# Patient Record
Sex: MALE | Race: BLACK OR AFRICAN AMERICAN | NOT HISPANIC OR LATINO | Employment: FULL TIME | URBAN - METROPOLITAN AREA
[De-identification: names, ages, dates, MRNs, and addresses within clinical notes are randomized per-mention and may not be internally consistent; named-entity substitution may affect disease eponyms.]

---

## 2017-02-09 ENCOUNTER — HOSPITAL ENCOUNTER (OUTPATIENT)
Facility: MEDICAL CENTER | Age: 22
End: 2017-02-09
Attending: FAMILY MEDICINE
Payer: COMMERCIAL

## 2017-02-09 ENCOUNTER — OFFICE VISIT (OUTPATIENT)
Dept: URGENT CARE | Facility: PHYSICIAN GROUP | Age: 22
End: 2017-02-09
Payer: COMMERCIAL

## 2017-02-09 VITALS
HEIGHT: 72 IN | SYSTOLIC BLOOD PRESSURE: 118 MMHG | WEIGHT: 160 LBS | HEART RATE: 74 BPM | OXYGEN SATURATION: 99 % | BODY MASS INDEX: 21.67 KG/M2 | TEMPERATURE: 98.3 F | DIASTOLIC BLOOD PRESSURE: 62 MMHG | RESPIRATION RATE: 12 BRPM

## 2017-02-09 DIAGNOSIS — D70.9 NEUTROPENIA, UNSPECIFIED TYPE (HCC): ICD-10-CM

## 2017-02-09 DIAGNOSIS — D69.6 THROMBOCYTOPENIA (HCC): ICD-10-CM

## 2017-02-09 DIAGNOSIS — R53.83 FATIGUE, UNSPECIFIED TYPE: ICD-10-CM

## 2017-02-09 DIAGNOSIS — M79.10 MYALGIA: ICD-10-CM

## 2017-02-09 LAB
ANION GAP SERPL CALC-SCNC: 7 MMOL/L (ref 0–11.9)
APPEARANCE UR: CLEAR
BASOPHILS # BLD AUTO: 0.01 K/UL (ref 0–0.12)
BASOPHILS NFR BLD AUTO: 0.4 % (ref 0–1.8)
BILIRUB UR STRIP-MCNC: ABNORMAL MG/DL
BUN SERPL-MCNC: 11 MG/DL (ref 8–22)
CALCIUM SERPL-MCNC: 9.7 MG/DL (ref 8.5–10.5)
CHLORIDE SERPL-SCNC: 104 MMOL/L (ref 96–112)
CK SERPL-CCNC: 148 U/L (ref 0–154)
CO2 SERPL-SCNC: 26 MMOL/L (ref 20–33)
COLOR UR AUTO: YELLOW
CREAT SERPL-MCNC: 0.98 MG/DL (ref 0.5–1.4)
EOSINOPHIL # BLD: 0.01 K/UL (ref 0–0.51)
EOSINOPHIL NFR BLD AUTO: 0.4 % (ref 0–6.9)
ERYTHROCYTE [DISTWIDTH] IN BLOOD BY AUTOMATED COUNT: 40.7 FL (ref 35.9–50)
FLUAV+FLUBV AG SPEC QL IA: NEGATIVE
GLUCOSE SERPL-MCNC: 81 MG/DL (ref 65–99)
GLUCOSE UR STRIP.AUTO-MCNC: ABNORMAL MG/DL
HCT VFR BLD AUTO: 45.4 % (ref 42–52)
HETEROPH AB SER QL LA: NEGATIVE
HGB BLD-MCNC: 14.8 G/DL (ref 14–18)
IMM GRANULOCYTES # BLD AUTO: 0 K/UL (ref 0–0.11)
IMM GRANULOCYTES NFR BLD AUTO: 0 % (ref 0–0.9)
INT CON NEG: NORMAL
INT CON NEG: NORMAL
INT CON POS: NORMAL
INT CON POS: NORMAL
KETONES UR STRIP.AUTO-MCNC: ABNORMAL MG/DL
LEUKOCYTE ESTERASE UR QL STRIP.AUTO: ABNORMAL
LYMPHOCYTES # BLD: 1.16 K/UL (ref 1–4.8)
LYMPHOCYTES NFR BLD AUTO: 45.8 % (ref 22–41)
MCH RBC QN AUTO: 29.2 PG (ref 27–33)
MCHC RBC AUTO-ENTMCNC: 32.6 G/DL (ref 33.7–35.3)
MCV RBC AUTO: 89.5 FL (ref 81.4–97.8)
MONOCYTES # BLD: 0.33 K/UL (ref 0–0.85)
MONOCYTES NFR BLD AUTO: 13 % (ref 0–13.4)
NEUTROPHILS # BLD: 1.02 K/UL (ref 1.82–7.42)
NEUTROPHILS NFR BLD AUTO: 40.4 % (ref 44–72)
NITRITE UR QL STRIP.AUTO: ABNORMAL
NRBC # BLD AUTO: 0 K/UL
NRBC BLD-RTO: 0 /100 WBC
PH UR STRIP.AUTO: 8 [PH] (ref 5–8)
PLATELET # BLD AUTO: 128 K/UL (ref 164–446)
PMV BLD AUTO: 10.3 FL (ref 9–12.9)
POTASSIUM SERPL-SCNC: 3.9 MMOL/L (ref 3.6–5.5)
PROT UR QL STRIP: ABNORMAL MG/DL
RBC # BLD AUTO: 5.07 M/UL (ref 4.7–6.1)
RBC UR QL AUTO: ABNORMAL
SODIUM SERPL-SCNC: 137 MMOL/L (ref 135–145)
SP GR UR STRIP.AUTO: 1.01
TSH SERPL DL<=0.005 MIU/L-ACNC: 0.6 UIU/ML (ref 0.3–3.7)
UROBILINOGEN UR STRIP-MCNC: 0.2 MG/DL
WBC # BLD AUTO: 2.5 K/UL (ref 4.8–10.8)

## 2017-02-09 PROCEDURE — 99203 OFFICE O/P NEW LOW 30 MIN: CPT | Performed by: FAMILY MEDICINE

## 2017-02-09 PROCEDURE — 84443 ASSAY THYROID STIM HORMONE: CPT

## 2017-02-09 PROCEDURE — 86308 HETEROPHILE ANTIBODY SCREEN: CPT | Performed by: FAMILY MEDICINE

## 2017-02-09 PROCEDURE — 87804 INFLUENZA ASSAY W/OPTIC: CPT | Performed by: FAMILY MEDICINE

## 2017-02-09 PROCEDURE — 82550 ASSAY OF CK (CPK): CPT

## 2017-02-09 PROCEDURE — 81002 URINALYSIS NONAUTO W/O SCOPE: CPT | Performed by: FAMILY MEDICINE

## 2017-02-09 PROCEDURE — 85025 COMPLETE CBC W/AUTO DIFF WBC: CPT

## 2017-02-09 PROCEDURE — 80048 BASIC METABOLIC PNL TOTAL CA: CPT

## 2017-02-09 ASSESSMENT — ENCOUNTER SYMPTOMS
BODY ACHES: 1
EYE REDNESS: 0
WEIGHT LOSS: 0
FOCAL WEAKNESS: 0
EYE DISCHARGE: 0
SENSORY CHANGE: 0

## 2017-02-09 NOTE — PROGRESS NOTES
Subjective:      Irineo Loyola is a 21 y.o. male who presents with Generalized Body Aches            Generalized Body Aches  This is a new problem. Episode onset: 3-4 days myalgia, fatigue, mild nasal congestion. No HA. No ST. No cough. No new medications. Using OTC advil.  Pertinent negatives include no rash.   No other aggravating or alleviating factors.      Review of Systems   Constitutional: Negative for weight loss and malaise/fatigue.   Eyes: Negative for discharge and redness.   Skin: Negative for itching and rash.   Neurological: Negative for sensory change and focal weakness.   .  Medications, Allergies, and current problem list reviewed today in Epic  No know PMH/FH sickle cell anemia       Objective:     /62 mmHg  Pulse 74  Temp(Src) 36.8 °C (98.3 °F)  Resp 12  Ht 1.829 m (6')  Wt 72.576 kg (160 lb)  BMI 21.70 kg/m2  SpO2 99%     Physical Exam   Constitutional: He is oriented to person, place, and time. He appears well-developed and well-nourished. No distress.   HENT:   Head: Normocephalic and atraumatic.   Nasal congestion     Eyes: Conjunctivae and EOM are normal. Pupils are equal, round, and reactive to light.   Neck: Neck supple.   Cardiovascular: Normal rate, regular rhythm and normal heart sounds.    Pulmonary/Chest: Effort normal and breath sounds normal. He has no wheezes.   Abdominal: Soft. Bowel sounds are normal. There is no tenderness.   Musculoskeletal: Normal range of motion. He exhibits no edema.   Lymphadenopathy:     He has no cervical adenopathy.   Neurological: He is alert and oriented to person, place, and time.               Assessment/Plan:     1. Myalgia  POCT Influenza A/B    POCT Urinalysis    CBC WITH DIFFERENTIAL    BASIC METABOLIC PANEL    TSH    CREATINE KINASE   2. Fatigue, unspecified type  POCT Mononucleosis (mono)    POCT Influenza A/B    POCT Urinalysis    CBC WITH DIFFERENTIAL    BASIC METABOLIC PANEL    TSH    CREATINE KINASE       UA reviewed  [place]  negative  Influenza negative    Will f/u labs

## 2017-02-10 NOTE — PROGRESS NOTES
Labs reviewed. Will refer to hematology for leukopenia/thrombocytopenia. Attempted to call patient without success.

## 2017-02-15 ENCOUNTER — OFFICE VISIT (OUTPATIENT)
Dept: HEMATOLOGY ONCOLOGY | Facility: MEDICAL CENTER | Age: 22
End: 2017-02-15
Payer: COMMERCIAL

## 2017-02-15 ENCOUNTER — HOSPITAL ENCOUNTER (OUTPATIENT)
Facility: MEDICAL CENTER | Age: 22
End: 2017-02-15
Attending: INTERNAL MEDICINE
Payer: COMMERCIAL

## 2017-02-15 ENCOUNTER — NON-PROVIDER VISIT (OUTPATIENT)
Dept: HEMATOLOGY ONCOLOGY | Facility: MEDICAL CENTER | Age: 22
End: 2017-02-15
Payer: COMMERCIAL

## 2017-02-15 VITALS
RESPIRATION RATE: 16 BRPM | OXYGEN SATURATION: 100 % | DIASTOLIC BLOOD PRESSURE: 70 MMHG | BODY MASS INDEX: 22.7 KG/M2 | TEMPERATURE: 99.1 F | HEIGHT: 72 IN | WEIGHT: 167.6 LBS | SYSTOLIC BLOOD PRESSURE: 102 MMHG | HEART RATE: 70 BPM

## 2017-02-15 VITALS
RESPIRATION RATE: 16 BRPM | SYSTOLIC BLOOD PRESSURE: 102 MMHG | TEMPERATURE: 99.1 F | HEART RATE: 70 BPM | BODY MASS INDEX: 22.7 KG/M2 | WEIGHT: 167.6 LBS | DIASTOLIC BLOOD PRESSURE: 70 MMHG | OXYGEN SATURATION: 100 % | HEIGHT: 72 IN

## 2017-02-15 DIAGNOSIS — D70.3 NEUTROPENIA ASSOCIATED WITH INFECTION (HCC): ICD-10-CM

## 2017-02-15 DIAGNOSIS — D69.6 THROMBOCYTOPENIA (HCC): ICD-10-CM

## 2017-02-15 PROBLEM — D70.9 NEUTROPENIA (HCC): Status: ACTIVE | Noted: 2017-02-15

## 2017-02-15 LAB
BASOPHILS # BLD AUTO: 0.03 K/UL (ref 0–0.12)
BASOPHILS NFR BLD AUTO: 0.9 % (ref 0–1.8)
EOSINOPHIL # BLD: 0.03 K/UL (ref 0–0.51)
EOSINOPHIL NFR BLD AUTO: 0.9 % (ref 0–6.9)
ERYTHROCYTE [DISTWIDTH] IN BLOOD BY AUTOMATED COUNT: 38.8 FL (ref 35.9–50)
HCT VFR BLD AUTO: 42.1 % (ref 42–52)
HGB BLD-MCNC: 14.3 G/DL (ref 14–18)
LYMPHOCYTES # BLD: 1.95 K/UL (ref 1–4.8)
LYMPHOCYTES NFR BLD AUTO: 69.5 % (ref 22–41)
MANUAL DIFF BLD: ABNORMAL
MCH RBC QN AUTO: 29.7 PG (ref 27–33)
MCHC RBC AUTO-ENTMCNC: 34 G/DL (ref 33.7–35.3)
MCV RBC AUTO: 87.3 FL (ref 81.4–97.8)
MONOCYTES # BLD: 0.15 K/UL (ref 0–0.85)
MONOCYTES NFR BLD AUTO: 5.2 % (ref 0–13.4)
NEUTROPHILS # BLD: 0.66 K/UL (ref 1.82–7.42)
NEUTROPHILS NFR BLD AUTO: 23.5 % (ref 44–72)
PLATELET # BLD AUTO: 147 K/UL (ref 164–446)
PMV BLD AUTO: 9.8 FL (ref 9–12.9)
RBC # BLD AUTO: 4.82 M/UL (ref 4.7–6.1)
WBC # BLD AUTO: 2.8 K/UL (ref 4.8–10.8)

## 2017-02-15 PROCEDURE — 36415 COLL VENOUS BLD VENIPUNCTURE: CPT | Performed by: INTERNAL MEDICINE

## 2017-02-15 PROCEDURE — 85007 BL SMEAR W/DIFF WBC COUNT: CPT

## 2017-02-15 PROCEDURE — 99204 OFFICE O/P NEW MOD 45 MIN: CPT | Performed by: INTERNAL MEDICINE

## 2017-02-15 PROCEDURE — 85027 COMPLETE CBC AUTOMATED: CPT

## 2017-02-15 ASSESSMENT — PAIN SCALES - GENERAL
PAINLEVEL: NO PAIN
PAINLEVEL: NO PAIN

## 2017-02-15 NOTE — MR AVS SNAPSHOT
Irineo Loyola   2/15/2017 9:40 AM   Office Visit   MRN: [id number]    Department:  [facility]   Dept Phone:  [phone]    Description:  Male : 1995   Provider:  Victorino Maldonado M.D.           Reason for Visit     New Patient Leukopenia. Ref: Dr. Sanchez      Allergies as of 2/15/2017     No Known Allergies      Vital Signs     Blood Pressure Pulse Temperature Respirations Height Weight    102/70 mmHg 70 37.3 °C (99.1 °F) 16 1.829 m (6') 76.023 kg (167 lb 9.6 oz)    Body Mass Index Oxygen Saturation Smoking Status             22.73 kg/m2 100% Never Smoker          Basic Information     Date Of Birth Sex Race Ethnicity Preferred Language    1995 Male Black or  Non- English      Problem List              ICD-10-CM Priority Class Noted - Resolved    Thrombocytopenia (CMS-HCC) D69.6   2/15/2017 - Present      Health Maintenance        Date Due Completion Dates    IMM HEP B VACCINE (1 of 3 - Primary Series) 1993 ---    IMM HEP A VACCINE (1 of 2 - Standard Series) 1994 ---    IMM HPV VACCINE (1 of 3 - Male 3 Dose Series) 2004 ---    IMM VARICELLA (CHICKENPOX) VACCINE (1 of 2 - 2 Dose Adolescent Series) 2006 ---    IMM DTaP/Tdap/Td Vaccine (1 - Tdap) 2012 ---    IMM INFLUENZA (1) 2016 ---            Current Immunizations     No immunizations on file.      Below and/or attached are the medications your provider expects you to take. Review all of your home medications and newly ordered medications with your provider and/or pharmacist. Follow medication instructions as directed by your provider and/or pharmacist. Please keep your medication list with you and share with your provider. Update the information when medications are discontinued, doses are changed, or new medications (including over-the-counter products) are added; and carry medication information at all times in the event of emergency situations     Allergies:  No Known Allergies             Medications  Valid as of: February 15, 2017 - 11:26 AM    Generic Name Brand Name Tablet Size Instructions for use    .                 Medicines prescribed today were sent to:     [facility]/PHARMACY #[id number] - [place], [place] - [address]    [address] [place] [address]    Phone: [phone] Fax: [phone]    Open 24 Hours?: No      Medication refill instructions:       If your prescription bottle indicates you have medication refills left, it is not necessary to call your provider’s office. Please contact your pharmacy and they will refill your medication.    If your prescription bottle indicates you do not have any refills left, you may request refills at any time through one of the following ways: The online AgentPair system (except Urgent Care), by calling your provider’s office, or by asking your pharmacy to contact your provider’s office with a refill request. Medication refills are processed only during regular business hours and may not be available until the next business day. Your provider may request additional information or to have a follow-up visit with you prior to refilling your medication.   *Please Note: Medication refills are assigned a new Rx number when refilled electronically. Your pharmacy may indicate that no refills were authorized even though a new prescription for the same medication is available at the pharmacy. Please request the medicine by name with the pharmacy before contacting your provider for a refill.           AgentPair Access Code: [email or web address]  Expires: 3/17/2017 11:26 AM    AgentPair  A secure, online tool to manage your health information     [employer]’s AgentPair® is a secure, online tool that connects you to your personalized health information from the privacy of your home -- day or night - making it very easy for you to manage your healthcare. Once the activation process is completed, you can even access your medical information using the AgentPair dana, which is  available for free in the Apple Scout store or Google Play store.     Converged Access provides the following levels of access (as shown below):   My Chart Features   [facility] Doctor [facility]  Specialists [facility]  Urgent  Care Non-[facility]  Primary Care  Doctor   Email your healthcare team securely and privately 24/7 X X X    Manage appointments: schedule your next appointment; view details of past/upcoming appointments X      Request prescription refills. X      View recent personal medical records, including lab and immunizations X X X X   View health record, including health history, allergies, medications X X X X   Read reports about your outpatient visits, procedures, consult and ER notes X X X X   See your discharge summary, which is a recap of your hospital and/or ER visit that includes your diagnosis, lab results, and care plan. X X       How to register for Converged Access:  1. Go to  [email or web address].  2. Click on the Sign Up Now box, which takes you to the New Member Sign Up page. You will need to provide the following information:  a. Enter your Converged Access Access Code exactly as it appears at the top of this page. (You will not need to use this code after you’ve completed the sign-up process. If you do not sign up before the expiration date, you must request a new code.)   b. Enter your date of birth.   c. Enter your home email address.   d. Click Submit, and follow the next screen’s instructions.  3. Create a Converged Access ID. This will be your Converged Access login ID and cannot be changed, so think of one that is secure and easy to remember.  4. Create a Converged Access password. You can change your password at any time.  5. Enter your Password Reset Question and Answer. This can be used at a later time if you forget your password.   6. Enter your e-mail address. This allows you to receive e-mail notifications when new information is available in Converged Access.  7. Click Sign Up. You can now view your health information.    For  assistance activating your [employer] account, call [phone]

## 2017-02-15 NOTE — MR AVS SNAPSHOT
Irineo Loyola   2/15/2017 10:30 AM   Non-Provider Visit   MRN: [id number]    Department:  [facility]   Dept Phone:  [phone]    Description:  Male : 1995   Provider:  [facility] MA 1           Reason for Visit     Other Labs, peripheral CBC with manual diff      Allergies as of 2/15/2017     No Known Allergies      You were diagnosed with     Thrombocytopenia (CMS-HCC)   [454902]         Vital Signs     Blood Pressure Pulse Temperature Respirations Height Weight    102/70 mmHg 70 37.3 °C (99.1 °F) 16 1.829 m (6') 76.023 kg (167 lb 9.6 oz)    Body Mass Index Oxygen Saturation Smoking Status             22.73 kg/m2 100% Never Smoker          Basic Information     Date Of Birth Sex Race Ethnicity Preferred Language    1995 Male Black or  Non- English      Problem List              ICD-10-CM Priority Class Noted - Resolved    Thrombocytopenia (CMS-HCC) D69.6   2/15/2017 - Present      Health Maintenance        Date Due Completion Dates    IMM HEP B VACCINE (1 of 3 - Primary Series) 1993 ---    IMM HEP A VACCINE (1 of 2 - Standard Series) 1994 ---    IMM HPV VACCINE (1 of 3 - Male 3 Dose Series) 2004 ---    IMM VARICELLA (CHICKENPOX) VACCINE (1 of 2 - 2 Dose Adolescent Series) 2006 ---    IMM DTaP/Tdap/Td Vaccine (1 - Tdap) 2012 ---    IMM INFLUENZA (1) 2016 ---            Current Immunizations     No immunizations on file.      Below and/or attached are the medications your provider expects you to take. Review all of your home medications and newly ordered medications with your provider and/or pharmacist. Follow medication instructions as directed by your provider and/or pharmacist. Please keep your medication list with you and share with your provider. Update the information when medications are discontinued, doses are changed, or new medications (including over-the-counter products) are added; and carry medication information at all times in the  event of emergency situations     Allergies:  No Known Allergies          Medications  Valid as of: February 15, 2017 - 11:27 AM    Generic Name Brand Name Tablet Size Instructions for use    .                 Medicines prescribed today were sent to:     [facility]/PHARMACY #[id number] - [place], [place] - [address]    [address] [place] [address]    Phone: [phone] Fax: [phone]    Open 24 Hours?: No      Medication refill instructions:       If your prescription bottle indicates you have medication refills left, it is not necessary to call your provider’s office. Please contact your pharmacy and they will refill your medication.    If your prescription bottle indicates you do not have any refills left, you may request refills at any time through one of the following ways: The online Siftit system (except Urgent Care), by calling your provider’s office, or by asking your pharmacy to contact your provider’s office with a refill request. Medication refills are processed only during regular business hours and may not be available until the next business day. Your provider may request additional information or to have a follow-up visit with you prior to refilling your medication.   *Please Note: Medication refills are assigned a new Rx number when refilled electronically. Your pharmacy may indicate that no refills were authorized even though a new prescription for the same medication is available at the pharmacy. Please request the medicine by name with the pharmacy before contacting your provider for a refill.           Siftit Access Code: [email or web address]  Expires: 3/17/2017 11:26 AM    Siftit  A secure, online tool to manage your health information     [employer]’s Siftit® is a secure, online tool that connects you to your personalized health information from the privacy of your home -- day or night - making it very easy for you to manage your healthcare. Once the activation process is completed, you can even  access your medical information using the Advanced Oncotherapy scout, which is available for free in the Apple Scout store or Google Play store.     Advanced Oncotherapy provides the following levels of access (as shown below):   My Chart Features   [facility] Doctor [facility]  Specialists [facility]  Urgent  Care Non-[facility]  Primary Care  Doctor   Email your healthcare team securely and privately 24/7 X X X    Manage appointments: schedule your next appointment; view details of past/upcoming appointments X      Request prescription refills. X      View recent personal medical records, including lab and immunizations X X X X   View health record, including health history, allergies, medications X X X X   Read reports about your outpatient visits, procedures, consult and ER notes X X X X   See your discharge summary, which is a recap of your hospital and/or ER visit that includes your diagnosis, lab results, and care plan. X X       How to register for Advanced Oncotherapy:  1. Go to  [email or web address].  2. Click on the Sign Up Now box, which takes you to the New Member Sign Up page. You will need to provide the following information:  a. Enter your Advanced Oncotherapy Access Code exactly as it appears at the top of this page. (You will not need to use this code after you’ve completed the sign-up process. If you do not sign up before the expiration date, you must request a new code.)   b. Enter your date of birth.   c. Enter your home email address.   d. Click Submit, and follow the next screen’s instructions.  3. Create a Advanced Oncotherapy ID. This will be your Advanced Oncotherapy login ID and cannot be changed, so think of one that is secure and easy to remember.  4. Create a Advanced Oncotherapy password. You can change your password at any time.  5. Enter your Password Reset Question and Answer. This can be used at a later time if you forget your password.   6. Enter your e-mail address. This allows you to receive e-mail notifications when new information is available in Advanced Oncotherapy.  7. Click  Sign Up. You can now view your health information.    For assistance activating your [employer] account, call [phone]

## 2017-02-15 NOTE — PROGRESS NOTES
Consult Note: Hematology    Date of consultation: 2/15/2017 10:30 AM    Reason for consultation: Leukopenia and pancytopenia      History of presenting illness:      Irineo Loyola  is a pleasant 21 y.o. year old -American male who presented with myalgias and nasal congestion last week and was found to have leukopenia with relative lymphocytosis and mild cytopenia. Monospot testing and flu screen was negative. He is here for further evaluation of this. He is feeling much better. His CBC continues to show the above picture. He does not have any pre-existing illness. He was never informed of any cytopenias prior to this.       Past Medical History:  No past medical history on file.    Past surgical history:  No past surgical history on file.    Allergies:  Review of patient's allergies indicates no known allergies.    Medications:    No current outpatient prescriptions on file.     No current facility-administered medications for this visit.       Social History:     Social History     Social History   • Marital Status:      Spouse Name: N/A   • Number of Children: N/A   • Years of Education: N/A     Occupational History   • Not on file.     Social History Main Topics   • Smoking status: Never Smoker    • Smokeless tobacco: Not on file   • Alcohol Use: Not on file   • Drug Use: Not on file   • Sexual Activity: Not on file     Other Topics Concern   • Not on file     Social History Narrative   • No narrative on file       Family History:   No family history on file.    Review of Systems:  All other review of systems are negative except what was mentioned above in the HPI.    Constitutional: No fever, chills, weight loss ,malaise/fatigue.    HEENT: No new auditory or visual complaints. No sore throat and neck pain.     Respiratory:No new cough, sputum production, shortness of breath and wheezing.    Cardiovascular: No new chest pain, palpitations, orthopnea and leg swelling.    Gastrointestinal: No  heartburn, nausea, vomiting ,abdominal pain, hematochezia or melena     Genitourinary: Negative for dysuria, hematuria    Musculoskeletal: No new arthralgias or myalgias   Skin: Negative for rash and itching.    Neurological: Negative for focal weakness or headaches.    Endo/Heme/Allergies: No abnormal bleed/bruise.    Psychiatric/Behavioral: No new depression/anxiety.    Physical Exam:  Vitals:   /70 mmHg  Pulse 70  Temp(Src) 37.3 °C (99.1 °F)  Resp 16  Ht 1.829 m (6')  Wt 76.023 kg (167 lb 9.6 oz)  BMI 22.73 kg/m2  SpO2 100%    General: Not in acute distress, alert and oriented x 3  HEENT: No pallor, icterus. Oropharynx clear.   Neck: Supple, no palpable masses.  Lymph nodes: No palpable cervical, supraclavicular, axillary or inguinal lymphadenopathy.    CVS: regular rate and rhythm, no rubs or gallops  RESP: Clear to auscultate bilaterally, no wheezing or crackles.   ABD: Soft, non tender, non distended, positive bowel sounds, no palpable organomegaly  EXT: No edema or cyanosis  CNS: Alert and oriented x3, No focal deficits.  Skin- No rash      Labs:   Recent Labs      02/15/17   1023   RBC  4.82   HEMOGLOBIN  14.3   HEMATOCRIT  42.1   PLATELETCT  147*     Lab Results   Component Value Date/Time    SODIUM 137 02/09/2017 04:13 PM    POTASSIUM 3.9 02/09/2017 04:13 PM    CHLORIDE 104 02/09/2017 04:13 PM    CO2 26 02/09/2017 04:13 PM    GLUCOSE 81 02/09/2017 04:13 PM    BUN 11 02/09/2017 04:13 PM    CREATININE 0.98 02/09/2017 04:13 PM      Peripheral smear- rbc's-normocytic normochromic. WBC shows relative lymphocytosis with reactive appearing mature lymphocytes. He has mild monocytosis. No immature forms or blasts. Platelets minimally reduced with no abnormalities.      Assessment and Plan:  Leukopenia with relative lymphocytosis and mild thrombocytopenia- this appears to be post viral. Clinically, he is feeling better and I expect his counts to improve gradually. I will do basic workup for his  cytopenia including hepatitis, HIV screen, ESR and SUSAN titer along with a CMP. I will recheck his CBC in  3 weeks from now and hopefully his counts will be better by that time. If not, we will consider doing further workup at that point.    He agreed and verbalized his agreement and understanding with the current plan.  I answered all questions and concerns he has at this time.              Thank you for allowing me to participate in his care.    Please note that this dictation was created using voice recognition software. I have made every reasonable attempt to correct obvious errors, but I expect that there are errors of grammar and possibly content that I did not discover before finalizing the note.      SIGNATURES:  Victorino Maldonado    CC:  Pcp Pt States None  No ref. provider found

## 2017-02-15 NOTE — NON-PROVIDER
Prepped patient's right arm for peripheral venipuncture. One lavender tube was drawn with a 21 gauge needle without complication.

## 2017-06-05 ENCOUNTER — TELEPHONE (OUTPATIENT)
Dept: HEMATOLOGY ONCOLOGY | Facility: MEDICAL CENTER | Age: 22
End: 2017-06-05

## 2017-06-05 NOTE — TELEPHONE ENCOUNTER
Attempted to leave a message for the patient to call me back at medical oncology. His mailbox was full and I was unable to leave a voicemail. He was supposed to be scheduled for a 3 week follow up with Dr. Maldonado in medical oncology and a repeat CBC and he was never scheduled. He will need to be scheduled for an established hematology appt and a repeat CBC next available.

## 2017-06-08 ENCOUNTER — OFFICE VISIT (OUTPATIENT)
Dept: URGENT CARE | Facility: PHYSICIAN GROUP | Age: 22
End: 2017-06-08
Payer: COMMERCIAL

## 2017-06-08 VITALS
OXYGEN SATURATION: 100 % | TEMPERATURE: 99.9 F | SYSTOLIC BLOOD PRESSURE: 124 MMHG | RESPIRATION RATE: 18 BRPM | HEART RATE: 78 BPM | DIASTOLIC BLOOD PRESSURE: 62 MMHG | BODY MASS INDEX: 22.92 KG/M2 | WEIGHT: 169 LBS
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
## 2017-06-08 PROCEDURE — 99202 OFFICE O/P NEW SF 15 MIN: CPT | Performed by: NURSE PRACTITIONER

## 2017-06-08 RX ORDER — CEPHALEXIN 250 MG/1
250 CAPSULE ORAL 4 TIMES DAILY
Qty: 28 CAP | Refills: 0 | Status: SHIPPED | OUTPATIENT
Start: 2017-06-08 | End: 2017-06-15

## 2017-06-08 ASSESSMENT — ENCOUNTER SYMPTOMS
DIAPHORESIS: 0
FEVER: 0
CHILLS: 0
WEAKNESS: 0

## 2017-06-08 NOTE — PROGRESS NOTES
Subjective:      Irineo Loyola is a 21 y.o. male who presents with Insect Bite            HPI  Patient comes in today with insect bites to the left thigh.  He noted some mild itching yesterday and reports worsening redness today.  He denies any other insect bites or stings.  He has not taken any medications to treat the symptoms.  No suspected exposure to bed bugs or scabies.  No household contacts with similar symptoms.  He does have known thrombocytopenia and neutropenia, and was recommended to follow up with Dr. Maldonado (hematology) for repeat CBC but has not followed up as recommended.  Denies any persistent fever or easy bruising.  He is leaving for a 10 day trip to [place] today.    Review of Systems   Constitutional: Negative for fever, chills, malaise/fatigue and diaphoresis.   Skin: Positive for itching and rash.   Neurological: Negative for weakness.     Medications, Allergies, and current problem list reviewed today in Epic     Objective:     /62 mmHg  Pulse 78  Temp(Src) 37.7 °C (99.9 °F)  Resp 18  Wt 76.658 kg (169 lb)  SpO2 100%     Physical Exam   Constitutional: He is oriented to person, place, and time. He appears well-developed and well-nourished. No distress.   Cardiovascular: Normal rate and normal heart sounds.    Pulmonary/Chest: Effort normal and breath sounds normal.   Neurological: He is alert and oriented to person, place, and time.   Skin: He is not diaphoretic.        Multiple erythematous irregularly round lesions on the left upper thigh.  Lesions are non-blanching and slightly warm to the touch.  Slight induration noted.  NO fluctuance.  Minimally TTP.  Pruritic.  No vesicles, weeping, purulence, or bleeding.  No inguinal adenopathy.   Vitals reviewed.              Assessment/Plan:     1. Insect bite, initial encounter  - cephALEXin (KEFLEX) 250 MG Cap; Take 1 Cap by mouth 4 times a day for 7 days.  Dispense: 28 Cap; Refill: 0    Discussed exam findings with patient.     Recommended initial conservative care trial with OTC Cortisone cream and OTC systemic Zytrec or Benadryl.  If symptoms improve dramatically, continue with conservative care prn.  If redness persists, fill and take Keflex as prescribed.  Follow up if any worsening redness, pain, red streaks, fever, or purulence develop.  Follow up with hematology for known thrombocytopenia and neutropenia re-check.  Patient verbalized understanding of and agreed with plan of care.

## 2017-06-08 NOTE — MR AVS SNAPSHOT
Irineo Loyola   2017 9:00 AM   Office Visit   MRN: [id number]    Department:  [facility]   Dept Phone:  [phone]    Description:  Male : 1995   Provider:  MATTY Villegas           Reason for Visit     Insect Bite left leg and hand x 1 day very itchy      Allergies as of 2017     No Known Allergies      Vital Signs     Blood Pressure Pulse Temperature Respirations Weight Oxygen Saturation    124/62 mmHg 78 37.7 °C (99.9 °F) 18 76.658 kg (169 lb) 100%    Smoking Status                   Never Smoker            Basic Information     Date Of Birth Sex Race Ethnicity Preferred Language    1995 Male Black or  Non- English      Problem List              ICD-10-CM Priority Class Noted - Resolved    Thrombocytopenia (CMS-HCC) D69.6   2/15/2017 - Present    Neutropenia (CMS-[facility]) D70.9   2/15/2017 - Present      Health Maintenance        Date Due Completion Dates    IMM HEP B VACCINE (1 of 3 - Primary Series) 1993 ---    IMM HEP A VACCINE (1 of 2 - Standard Series) 1994 ---    IMM HPV VACCINE (1 of 3 - Male 3 Dose Series) 2004 ---    IMM VARICELLA (CHICKENPOX) VACCINE (1 of 2 - 2 Dose Adolescent Series) 2006 ---    IMM DTaP/Tdap/Td Vaccine (1 - Tdap) 2012 ---            Current Immunizations     No immunizations on file.      Below and/or attached are the medications your provider expects you to take. Review all of your home medications and newly ordered medications with your provider and/or pharmacist. Follow medication instructions as directed by your provider and/or pharmacist. Please keep your medication list with you and share with your provider. Update the information when medications are discontinued, doses are changed, or new medications (including over-the-counter products) are added; and carry medication information at all times in the event of emergency situations     Allergies:  No Known Allergies          Medications   Valid as of: June 08, 2017 -  9:20 AM    Generic Name Brand Name Tablet Size Instructions for use    .                 Medicines prescribed today were sent to:     [id number] - [place], [place] - [address]    [address] [place] [address]    Phone: [phone] Fax: [phone]    Open 24 Hours?: No      Medication refill instructions:       If your prescription bottle indicates you have medication refills left, it is not necessary to call your provider’s office. Please contact your pharmacy and they will refill your medication.    If your prescription bottle indicates you do not have any refills left, you may request refills at any time through one of the following ways: The online VectorMAX system (except Urgent Care), by calling your provider’s office, or by asking your pharmacy to contact your provider’s office with a refill request. Medication refills are processed only during regular business hours and may not be available until the next business day. Your provider may request additional information or to have a follow-up visit with you prior to refilling your medication.   *Please Note: Medication refills are assigned a new Rx number when refilled electronically. Your pharmacy may indicate that no refills were authorized even though a new prescription for the same medication is available at the pharmacy. Please request the medicine by name with the pharmacy before contacting your provider for a refill.           VectorMAX Access Code: [email or web address]  Expires: 7/8/2017  9:20 AM    VectorMAX  A secure, online tool to manage your health information     [employer]’s VectorMAX® is a secure, online tool that connects you to your personalized health information from the privacy of your home -- day or night - making it very easy for you to manage your healthcare. Once the activation process is completed, you can even access your medical information using the VectorMAX dana, which is available for free in the  Apple Scout store or Google Play store.     Streamfile provides the following levels of access (as shown below):   My Chart Features   [facility] Doctor [facility]  Specialists [facility]  Urgent  Care Non-[facility]  Primary Care  Doctor   Email your healthcare team securely and privately 24/7 X X X    Manage appointments: schedule your next appointment; view details of past/upcoming appointments X      Request prescription refills. X      View recent personal medical records, including lab and immunizations X X X X   View health record, including health history, allergies, medications X X X X   Read reports about your outpatient visits, procedures, consult and ER notes X X X X   See your discharge summary, which is a recap of your hospital and/or ER visit that includes your diagnosis, lab results, and care plan. X X       How to register for Streamfile:  1. Go to  [email or web address].  2. Click on the Sign Up Now box, which takes you to the New Member Sign Up page. You will need to provide the following information:  a. Enter your Streamfile Access Code exactly as it appears at the top of this page. (You will not need to use this code after you’ve completed the sign-up process. If you do not sign up before the expiration date, you must request a new code.)   b. Enter your date of birth.   c. Enter your home email address.   d. Click Submit, and follow the next screen’s instructions.  3. Create a Streamfile ID. This will be your Streamfile login ID and cannot be changed, so think of one that is secure and easy to remember.  4. Create a Streamfile password. You can change your password at any time.  5. Enter your Password Reset Question and Answer. This can be used at a later time if you forget your password.   6. Enter your e-mail address. This allows you to receive e-mail notifications when new information is available in Streamfile.  7. Click Sign Up. You can now view your health information.    For assistance activating your  MyChart account, call [phone]

## 2017-07-12 ENCOUNTER — NON-PROVIDER VISIT (OUTPATIENT)
Dept: OCCUPATIONAL MEDICINE | Facility: CLINIC | Age: 22
End: 2017-07-12

## 2017-07-12 DIAGNOSIS — Z02.1 ENCOUNTER FOR PRE-EMPLOYMENT DRUG TESTING: ICD-10-CM

## 2017-07-12 PROCEDURE — 8898 PR URINE 11 PANEL - SEND TO LAB: Performed by: PREVENTIVE MEDICINE

## 2017-08-21 ENCOUNTER — OFFICE VISIT (OUTPATIENT)
Dept: URGENT CARE | Facility: CLINIC | Age: 22
End: 2017-08-21

## 2017-08-21 ENCOUNTER — NON-PROVIDER VISIT (OUTPATIENT)
Dept: URGENT CARE | Facility: CLINIC | Age: 22
End: 2017-08-21

## 2017-08-21 DIAGNOSIS — Z01.89 RESPIRATORY CLEARANCE EXAMINATION, ENCOUNTER FOR: ICD-10-CM

## 2017-08-21 DIAGNOSIS — Z29.89 NEED FOR ISOLATION: ICD-10-CM

## 2017-08-21 PROCEDURE — 94375 RESPIRATORY FLOW VOLUME LOOP: CPT | Performed by: NURSE PRACTITIONER

## 2017-08-21 NOTE — PROGRESS NOTES
Irineo KINJAL Nir is a 22 y.o. male here for a non-provider visit for Respiratory Clearance    If abnormal was an in office provider notified today (if so, indicate provider)? Yes  Routed to PCP? No

## 2017-08-21 NOTE — MR AVS SNAPSHOT
Irineo KINJAL Loyola   2017 9:15 AM   Appointment   MRN: [id number]    Department:  [facility]   Dept Phone:  [phone]    Description:  Male : 1995   Provider:  [facility]           Allergies as of 2017     No Known Allergies      Vital Signs     Smoking Status                   Never Smoker            Basic Information     Date Of Birth Sex Race Ethnicity Preferred Language    1995 Male Black or  Non- English      Problem List              ICD-10-CM Priority Class Noted - Resolved    Thrombocytopenia (CMS-HCC) D69.6   2/15/2017 - Present    Neutropenia (CMS-[facility]) D70.9   2/15/2017 - Present      Health Maintenance        Date Due Completion Dates    IMM HEP B VACCINE (1 of 3 - Primary Series) 1993 ---    IMM HEP A VACCINE (1 of 2 - Standard Series) 1994 ---    IMM HPV VACCINE (1 of 3 - Male 3 Dose Series) 2004 ---    IMM VARICELLA (CHICKENPOX) VACCINE (1 of 2 - 2 Dose Adolescent Series) 2006 ---    IMM DTaP/Tdap/Td Vaccine (1 - Tdap) 2012 ---    IMM INFLUENZA (1) 2017 ---            Current Immunizations     No immunizations on file.      Below and/or attached are the medications your provider expects you to take. Review all of your home medications and newly ordered medications with your provider and/or pharmacist. Follow medication instructions as directed by your provider and/or pharmacist. Please keep your medication list with you and share with your provider. Update the information when medications are discontinued, doses are changed, or new medications (including over-the-counter products) are added; and carry medication information at all times in the event of emergency situations     Allergies:  No Known Allergies          Medications  Valid as of: 2017 - 10:11 AM    Generic Name Brand Name Tablet Size Instructions for use    .                 Medicines prescribed today were sent to:     [id number] -  [place], [place] - [address]    [address] [place] [address]    Phone: [phone] Fax: [phone]    Open 24 Hours?: No      Medication refill instructions:       If your prescription bottle indicates you have medication refills left, it is not necessary to call your provider’s office. Please contact your pharmacy and they will refill your medication.    If your prescription bottle indicates you do not have any refills left, you may request refills at any time through one of the following ways: The online Xencor system (except Urgent Care), by calling your provider’s office, or by asking your pharmacy to contact your provider’s office with a refill request. Medication refills are processed only during regular business hours and may not be available until the next business day. Your provider may request additional information or to have a follow-up visit with you prior to refilling your medication.   *Please Note: Medication refills are assigned a new Rx number when refilled electronically. Your pharmacy may indicate that no refills were authorized even though a new prescription for the same medication is available at the pharmacy. Please request the medicine by name with the pharmacy before contacting your provider for a refill.           Xencor Access Code: [email or web address]  Expires: 9/20/2017 10:11 AM    Xencor  A secure, online tool to manage your health information     [employer]’s Xencor® is a secure, online tool that connects you to your personalized health information from the privacy of your home -- day or night - making it very easy for you to manage your healthcare. Once the activation process is completed, you can even access your medical information using the Xencor scout, which is available for free in the Apple Scout store or Google Play store.     Xencor provides the following levels of access (as shown below):   My Chart Features   [facility] Doctor [facility]  Specialists  [facility]  [facility]gent  Care [facility]  Primary Care  Doctor   Email your healthcare team securely and privately 24/7 X X X    Manage appointments: schedule your next appointment; view details of past/upcoming appointments X      Request prescription refills. X      View recent personal medical records, including lab and immunizations X X X X   View health record, including health history, allergies, medications X X X X   Read reports about your outpatient visits, procedures, consult and ER notes X X X X   See your discharge summary, which is a recap of your hospital and/or ER visit that includes your diagnosis, lab results, and care plan. X X       How to register for TaskRabbit:  1. Go to  [email or web address].  2. Click on the Sign Up Now box, which takes you to the New Member Sign Up page. You will need to provide the following information:  a. Enter your TaskRabbit Access Code exactly as it appears at the top of this page. (You will not need to use this code after you’ve completed the sign-up process. If you do not sign up before the expiration date, you must request a new code.)   b. Enter your date of birth.   c. Enter your home email address.   d. Click Submit, and follow the next screen’s instructions.  3. Create a TaskRabbit ID. This will be your TaskRabbit login ID and cannot be changed, so think of one that is secure and easy to remember.  4. Create a TaskRabbit password. You can change your password at any time.  5. Enter your Password Reset Question and Answer. This can be used at a later time if you forget your password.   6. Enter your e-mail address. This allows you to receive e-mail notifications when new information is available in TaskRabbit.  7. Click Sign Up. You can now view your health information.    For assistance activating your TaskRabbit account, call [phone]

## 2017-12-18 ENCOUNTER — OFFICE VISIT (OUTPATIENT)
Dept: MEDICAL GROUP | Facility: MEDICAL CENTER | Age: 22
End: 2017-12-18
Payer: COMMERCIAL

## 2017-12-18 VITALS
DIASTOLIC BLOOD PRESSURE: 78 MMHG | SYSTOLIC BLOOD PRESSURE: 114 MMHG | BODY MASS INDEX: 21.56 KG/M2 | HEIGHT: 72 IN | WEIGHT: 159.2 LBS | OXYGEN SATURATION: 95 % | TEMPERATURE: 97.4 F | HEART RATE: 78 BPM | RESPIRATION RATE: 14 BRPM

## 2017-12-18 DIAGNOSIS — Z00.00 PREVENTATIVE HEALTH CARE: ICD-10-CM

## 2017-12-18 DIAGNOSIS — N46.9 MALE INFERTILITY: ICD-10-CM

## 2017-12-18 DIAGNOSIS — Z76.89 ENCOUNTER TO ESTABLISH CARE: ICD-10-CM

## 2017-12-18 DIAGNOSIS — F43.9 STRESS: ICD-10-CM

## 2017-12-18 DIAGNOSIS — D69.6 THROMBOCYTOPENIA (HCC): ICD-10-CM

## 2017-12-18 PROCEDURE — 99214 OFFICE O/P EST MOD 30 MIN: CPT | Performed by: PHYSICIAN ASSISTANT

## 2017-12-18 ASSESSMENT — PATIENT HEALTH QUESTIONNAIRE - PHQ9
SUM OF ALL RESPONSES TO PHQ QUESTIONS 1-9: 17
5. POOR APPETITE OR OVEREATING: 3 - NEARLY EVERY DAY
CLINICAL INTERPRETATION OF PHQ2 SCORE: 6

## 2017-12-18 NOTE — PROGRESS NOTES
Subjective:   Irineo Loyola is a 22 y.o. male here today for establishing care and to discuss a few concerns.    Male infertility  This is a 22-year-old male who is here today to establish care. He is requesting a physical but has other concerns. He and his wife are trying Children. She's been evaluated and is able to conceive but concern is now that he may be infertile.    Thrombocytopenia (CMS-HCC)  Previously was diagnosed with thrombocytopenia with a white blood cell count of 2.8. At the time it was thought to be a post viral syndrome. He did not follow-up with oncology. He said that symptoms resolve and he is feeling much better so he decided not to follow up. Denies any recent fevers.    Stress  Also complains of some excess stress at work. Because of the stress he has been slightly depressed. Denies any homicidal or suicidal ideations. He does work 4 12s and then 3 12s the following week. May get anywhere to 4 to 7 hours of sleep.       Current medicines (including changes today)  No current outpatient prescriptions on file.     No current facility-administered medications for this visit.      He  has no past medical history of Asthma; Clotting disorder (CMS-HCC); or Diabetes (CMS-HCC).    ROS   No chest pain, no shortness of breath, no abdominal pain and all other systems were reviewed and are negative.       Objective:     Blood pressure 114/78, pulse 78, temperature 36.3 °C (97.4 °F), resp. rate 14, height 1.829 m (6'), weight 72.2 kg (159 lb 3.2 oz), SpO2 95 %. Body mass index is 21.59 kg/m².   Physical Exam:  Constitutional: Alert, no distress.  Skin: Warm, dry, good turgor, no rashes in visible areas.  Eye: Equal, round and reactive, conjunctiva clear, lids normal.  ENMT: Lips without lesions, good dentition, oropharynx clear.  Neck: Trachea midline, no masses.   Lymph: No cervical or supraclavicular lymphadenopathy  Respiratory: Unlabored respiratory effort, lungs clear to auscultation, no  wheezes, no ronchi.  Cardiovascular: Normal S1, S2, no murmur, no edema.  Abdomen: Soft, non-tender, no masses.  Psych: Alert and oriented x3, normal affect and mood.        Assessment and Plan:   The following treatment plan was discussed    1. Male infertility  Status unknown. Advised to contact [facility].  - SPERM COUNT, TOTAL    2. Thrombocytopenia (CMS-HCC)  Chronic condition and status unknown. CBC with differential ordered.  - CBC WITH DIFFERENTIAL; Future    3. Stress  Chronic condition. Referred to behavior health.  - Patient has been identified as being depressed and appropriate orders and counseling have been given  - REFERRAL TO BEHAVIORAL HEALTH    4. Preventative health care  Ordered labs fasting. He will be contacted with the results.  - COMP METABOLIC PANEL; Future  - LIPID PANEL    5. Encounter to establish care      Followup: Return if symptoms worsen or fail to improve.    Please note that this dictation was created using voice recognition software. I have made every reasonable attempt to correct obvious errors, but I expect that there are errors of grammar and possibly content that I did not discover before finalizing the note.

## 2017-12-18 NOTE — ASSESSMENT & PLAN NOTE
Previously was diagnosed with thrombocytopenia with a white blood cell count of 2.8. At the time it was thought to be a post viral syndrome. He did not follow-up with oncology. He said that symptoms resolve and he is feeling much better so he decided not to follow up. Denies any recent fevers.

## 2017-12-18 NOTE — ASSESSMENT & PLAN NOTE
Also complains of some excess stress at work. Because of the stress he has been slightly depressed. Denies any homicidal or suicidal ideations. He does work 4 12s and then 3 12s the following week. May get anywhere to 4 to 7 hours of sleep.

## 2017-12-18 NOTE — ASSESSMENT & PLAN NOTE
This is a 22-year-old male who is here today to establish care. He is requesting a physical but has other concerns. He and his wife are trying Children. She's been evaluated and is able to conceive but concern is now that he may be infertile.

## 2018-02-05 DIAGNOSIS — N46.9 MALE INFERTILITY: ICD-10-CM

## 2018-02-09 ENCOUNTER — HOSPITAL ENCOUNTER (OUTPATIENT)
Dept: LAB | Facility: MEDICAL CENTER | Age: 23
End: 2018-02-09
Attending: INTERNAL MEDICINE
Payer: COMMERCIAL

## 2018-02-09 ENCOUNTER — HOSPITAL ENCOUNTER (OUTPATIENT)
Dept: LAB | Facility: MEDICAL CENTER | Age: 23
End: 2018-02-09
Attending: PHYSICIAN ASSISTANT
Payer: COMMERCIAL

## 2018-02-09 DIAGNOSIS — D70.3 NEUTROPENIA ASSOCIATED WITH INFECTION (HCC): ICD-10-CM

## 2018-02-09 DIAGNOSIS — D69.6 THROMBOCYTOPENIA (HCC): ICD-10-CM

## 2018-02-09 DIAGNOSIS — Z00.00 PREVENTATIVE HEALTH CARE: ICD-10-CM

## 2018-02-09 LAB
ALBUMIN SERPL BCP-MCNC: 4.4 G/DL (ref 3.2–4.9)
ALBUMIN/GLOB SERPL: 1.4 G/DL
ALP SERPL-CCNC: 57 U/L (ref 30–99)
ALT SERPL-CCNC: 24 U/L (ref 2–50)
ANION GAP SERPL CALC-SCNC: 6 MMOL/L (ref 0–11.9)
AST SERPL-CCNC: 20 U/L (ref 12–45)
BASOPHILS # BLD AUTO: 0.6 % (ref 0–1.8)
BASOPHILS # BLD AUTO: 0.9 % (ref 0–1.8)
BASOPHILS # BLD: 0.02 K/UL (ref 0–0.12)
BASOPHILS # BLD: 0.03 K/UL (ref 0–0.12)
BILIRUB SERPL-MCNC: 0.4 MG/DL (ref 0.1–1.5)
BUN SERPL-MCNC: 15 MG/DL (ref 8–22)
CALCIUM SERPL-MCNC: 9.8 MG/DL (ref 8.5–10.5)
CHLORIDE SERPL-SCNC: 106 MMOL/L (ref 96–112)
CO2 SERPL-SCNC: 26 MMOL/L (ref 20–33)
CREAT SERPL-MCNC: 0.98 MG/DL (ref 0.5–1.4)
EOSINOPHIL # BLD AUTO: 0.02 K/UL (ref 0–0.51)
EOSINOPHIL # BLD AUTO: 0.02 K/UL (ref 0–0.51)
EOSINOPHIL NFR BLD: 0.6 % (ref 0–6.9)
EOSINOPHIL NFR BLD: 0.6 % (ref 0–6.9)
ERYTHROCYTE [DISTWIDTH] IN BLOOD BY AUTOMATED COUNT: 42 FL (ref 35.9–50)
ERYTHROCYTE [DISTWIDTH] IN BLOOD BY AUTOMATED COUNT: 42.3 FL (ref 35.9–50)
ERYTHROCYTE [SEDIMENTATION RATE] IN BLOOD BY WESTERGREN METHOD: 8 MM/HOUR (ref 0–15)
GLOBULIN SER CALC-MCNC: 3.2 G/DL (ref 1.9–3.5)
GLUCOSE SERPL-MCNC: 97 MG/DL (ref 65–99)
HAV IGM SERPL QL IA: NEGATIVE
HBV CORE IGM SER QL: NEGATIVE
HBV SURFACE AG SER QL: NEGATIVE
HCT VFR BLD AUTO: 43 % (ref 42–52)
HCT VFR BLD AUTO: 43.4 % (ref 42–52)
HCV AB SER QL: NEGATIVE
HGB BLD-MCNC: 14 G/DL (ref 14–18)
HGB BLD-MCNC: 14.4 G/DL (ref 14–18)
HIV 1+2 AB+HIV1 P24 AG SERPL QL IA: NON REACTIVE
IMM GRANULOCYTES # BLD AUTO: 0.02 K/UL (ref 0–0.11)
IMM GRANULOCYTES # BLD AUTO: 0.02 K/UL (ref 0–0.11)
IMM GRANULOCYTES NFR BLD AUTO: 0.6 % (ref 0–0.9)
IMM GRANULOCYTES NFR BLD AUTO: 0.6 % (ref 0–0.9)
LYMPHOCYTES # BLD AUTO: 1.74 K/UL (ref 1–4.8)
LYMPHOCYTES # BLD AUTO: 1.92 K/UL (ref 1–4.8)
LYMPHOCYTES NFR BLD: 56.3 % (ref 22–41)
LYMPHOCYTES NFR BLD: 57 % (ref 22–41)
MCH RBC QN AUTO: 28.9 PG (ref 27–33)
MCH RBC QN AUTO: 30.3 PG (ref 27–33)
MCHC RBC AUTO-ENTMCNC: 32.3 G/DL (ref 33.7–35.3)
MCHC RBC AUTO-ENTMCNC: 33.5 G/DL (ref 33.7–35.3)
MCV RBC AUTO: 89.7 FL (ref 81.4–97.8)
MCV RBC AUTO: 90.5 FL (ref 81.4–97.8)
MONOCYTES # BLD AUTO: 0.23 K/UL (ref 0–0.85)
MONOCYTES # BLD AUTO: 0.29 K/UL (ref 0–0.85)
MONOCYTES NFR BLD AUTO: 7.4 % (ref 0–13.4)
MONOCYTES NFR BLD AUTO: 8.6 % (ref 0–13.4)
NEUTROPHILS # BLD AUTO: 1.06 K/UL (ref 1.82–7.42)
NEUTROPHILS # BLD AUTO: 1.09 K/UL (ref 1.82–7.42)
NEUTROPHILS NFR BLD: 32.3 % (ref 44–72)
NEUTROPHILS NFR BLD: 34.5 % (ref 44–72)
NRBC # BLD AUTO: 0 K/UL
NRBC # BLD AUTO: 0 K/UL
NRBC BLD-RTO: 0 /100 WBC
NRBC BLD-RTO: 0 /100 WBC
PLATELET # BLD AUTO: 151 K/UL (ref 164–446)
PLATELET # BLD AUTO: 158 K/UL (ref 164–446)
PMV BLD AUTO: 10.4 FL (ref 9–12.9)
PMV BLD AUTO: 10.4 FL (ref 9–12.9)
POTASSIUM SERPL-SCNC: 4 MMOL/L (ref 3.6–5.5)
PROT SERPL-MCNC: 7.6 G/DL (ref 6–8.2)
RBC # BLD AUTO: 4.75 M/UL (ref 4.7–6.1)
RBC # BLD AUTO: 4.84 M/UL (ref 4.7–6.1)
SODIUM SERPL-SCNC: 138 MMOL/L (ref 135–145)
WBC # BLD AUTO: 3.1 K/UL (ref 4.8–10.8)
WBC # BLD AUTO: 3.4 K/UL (ref 4.8–10.8)

## 2018-02-09 PROCEDURE — 86038 ANTINUCLEAR ANTIBODIES: CPT

## 2018-02-09 PROCEDURE — 87389 HIV-1 AG W/HIV-1&-2 AB AG IA: CPT

## 2018-02-09 PROCEDURE — 80074 ACUTE HEPATITIS PANEL: CPT

## 2018-02-09 PROCEDURE — 36415 COLL VENOUS BLD VENIPUNCTURE: CPT

## 2018-02-09 PROCEDURE — 85025 COMPLETE CBC W/AUTO DIFF WBC: CPT

## 2018-02-09 PROCEDURE — 85025 COMPLETE CBC W/AUTO DIFF WBC: CPT | Mod: 91

## 2018-02-09 PROCEDURE — 85652 RBC SED RATE AUTOMATED: CPT

## 2018-02-09 PROCEDURE — 80053 COMPREHEN METABOLIC PANEL: CPT

## 2018-02-10 LAB — NUCLEAR IGG SER QL IA: NORMAL

## 2018-03-15 ENCOUNTER — TELEPHONE (OUTPATIENT)
Dept: HEMATOLOGY ONCOLOGY | Facility: MEDICAL CENTER | Age: 23
End: 2018-03-15

## 2018-03-15 NOTE — TELEPHONE ENCOUNTER
"Called patient regarding his missed appointment on 11/13/2017, he stated that to his understanding he does not have insurance right now and it will cost him money to have a labs drawn in which Dr. Maldonado had orders for. He did not want to make a f/v up visit due to insurance and stated \"i don't think i'm dying anytime soon and if I am I don't want to know\" I told him that I would check with Clari SARMIENTO Regarding if there is something we can figure out for him.   "

## 2018-04-11 NOTE — TELEPHONE ENCOUNTER
2nd attempt:  Spoke with patient he still does not have insurance and is not interested in scheduling a follow up at this time. I informed him I would make a note in his chart and if he changed his mind to please call us.